# Patient Record
Sex: FEMALE | Race: WHITE | NOT HISPANIC OR LATINO | RURAL
[De-identification: names, ages, dates, MRNs, and addresses within clinical notes are randomized per-mention and may not be internally consistent; named-entity substitution may affect disease eponyms.]

---

## 2018-05-07 ENCOUNTER — APPOINTMENT (RX ONLY)
Dept: RURAL CLINIC 4 | Facility: CLINIC | Age: 53
Setting detail: DERMATOLOGY
End: 2018-05-07

## 2018-05-07 DIAGNOSIS — L57.0 ACTINIC KERATOSIS: ICD-10-CM

## 2018-05-07 DIAGNOSIS — D49.2 NEOPLASM OF UNSPECIFIED BEHAVIOR OF BONE, SOFT TISSUE, AND SKIN: ICD-10-CM

## 2018-05-07 PROBLEM — L85.3 XEROSIS CUTIS: Status: ACTIVE | Noted: 2018-05-07

## 2018-05-07 PROCEDURE — 11755 BIOPSY NAIL UNIT: CPT | Mod: TA

## 2018-05-07 PROCEDURE — ? LIQUID NITROGEN

## 2018-05-07 PROCEDURE — 17000 DESTRUCT PREMALG LESION: CPT

## 2018-05-07 PROCEDURE — ? NAIL UNIT BIOPSY

## 2018-05-07 ASSESSMENT — LOCATION SIMPLE DESCRIPTION DERM
LOCATION SIMPLE: LEFT GREAT TOE
LOCATION SIMPLE: NOSE

## 2018-05-07 ASSESSMENT — LOCATION ZONE DERM
LOCATION ZONE: TOENAIL
LOCATION ZONE: NOSE

## 2018-05-07 ASSESSMENT — LOCATION DETAILED DESCRIPTION DERM
LOCATION DETAILED: LEFT GREAT TOENAIL
LOCATION DETAILED: NASAL DORSUM

## 2018-05-07 NOTE — PROCEDURE: NAIL UNIT BIOPSY
Wound Care: Polysporin ointment
Repair Type: None, Secondary Intention
Path Notes (To The Dermatopathologist): Pigmented lesion, suspect hematoma vs. melanoma
Accession #: [id number]
Simple Repair Text: Following the biopsy the surgical site was closed primarily.
Additional Anesthesia Volume In Cc: 0
Notification Instructions: Patient will be notified of biopsy results. However, patient instructed to call the office if not contacted within 2 weeks.
Punch Size In Mm: 3
Nail Avulsion: No
Secondary Intention Text: Following the biopsy the site will heal with secondary intention.
Anesthesia Method: Digital block
Biopsy Type: H and E
Epidermal Sutures: 6-0 Nylon
Pre-Op Text: A tourniquet was applied to the proximal digit and then the site was prepped.
Billing Type: Third-Party Bill
Anesthesia Volume In Cc: 1
Anesthesia Type: 1% lidocaine with epinephrine
Size Of Lesion In Cm (Optional): 0.3
Nail Avulsion Text: The nail plate was undermined, starting at the distal nail fold,  the nail plate from the nail bed. After detaching the nail plate from the nail bed the nail plate was avulsed.
Nail Matrix Exploration Text: The nail matrix was exposed by making releasing incisions, with a 15 blade scalpel, in the proximal nail fold. The proximal nail fold was then retracted to expose the nail matrix. Following the procedure the nail fold was replaced and sutured.
Shave Biopsy Method: 15 blade
Biopsy Technique: punch biopsy
Reconstruction Of Nail Bed With Graft Text: Following the biopsy the nail bed was repaired with a skin graft.
Consent: Written consent was obtained and risks were reviewed including but not limited to scarring, infection, bleeding, scabbing, incomplete removal, nerve damage and allergy to anesthesia.
Hemostasis: Drysol
Partial Nail Avulsion Text: An incision was made in the nail plate. The nail plate was undermined, starting at the distal nail fold,  the nail plate from the nail bed. After detaching the nail plate from the nail bed the a portion of the nail plate was avulsed.
Suture Removal: 14 days
Partial Removal Of Nail Plate Text: The nail plate was partially removed to expose the underlying lesion.
Detail Level: Detailed
Post-Care Instructions: I reviewed with the patient in detail post-care instructions. Patient is to keep the biopsy site dry overnight, and then apply bacitracin twice daily until healed. Patient may apply hydrogen peroxide soaks to remove any crusting.
Nail Bed Repair Text: Following the biopsy the nail bed was repaired.
Partial Removal And Replacement Of Nail Plate Text: The nail plate was partially removed to expose the underlying lesion. The nail plate was then replaced at the end of the procedure.

## 2018-05-07 NOTE — PROCEDURE: LIQUID NITROGEN
Detail Level: Detailed
Render Post-Care Instructions In Note?: no
Aperture Size (Optional): C
Number Of Freeze-Thaw Cycles: 2 freeze-thaw cycles
Duration Of Freeze Thaw-Cycle (Seconds): 20

## 2018-05-10 ENCOUNTER — RX ONLY (OUTPATIENT)
Age: 53
Setting detail: RX ONLY
End: 2018-05-10

## 2018-05-10 RX ORDER — TERBINAFINE HCL 250 MG
TABLET ORAL
Qty: 30 | Refills: 1 | Status: ERX | COMMUNITY
Start: 2018-05-10

## 2019-06-13 ENCOUNTER — APPOINTMENT (RX ONLY)
Dept: RURAL CLINIC 4 | Facility: CLINIC | Age: 54
Setting detail: DERMATOLOGY
End: 2019-06-13

## 2019-06-13 DIAGNOSIS — T69.1XX: ICD-10-CM

## 2019-06-13 PROBLEM — T69.1XXA CHILBLAINS, INITIAL ENCOUNTER: Status: ACTIVE | Noted: 2019-06-13

## 2019-06-13 PROCEDURE — 99212 OFFICE O/P EST SF 10 MIN: CPT

## 2019-06-13 PROCEDURE — ? TREATMENT REGIMEN

## 2019-06-13 PROCEDURE — ? COUNSELING

## 2019-06-13 ASSESSMENT — LOCATION DETAILED DESCRIPTION DERM
LOCATION DETAILED: RIGHT DORSAL FOOT
LOCATION DETAILED: LEFT DORSAL FOOT

## 2019-06-13 ASSESSMENT — LOCATION SIMPLE DESCRIPTION DERM
LOCATION SIMPLE: RIGHT FOOT
LOCATION SIMPLE: LEFT FOOT

## 2019-06-13 ASSESSMENT — LOCATION ZONE DERM: LOCATION ZONE: FEET

## 2019-06-13 NOTE — HPI: BLISTERS
How Severe Are Your Blisters?: moderate
Please Check The Phrase That Best Describes Your Blisters.: few in number
Is This A New Presentation, Or A Follow-Up?: Blisters

## 2019-06-13 NOTE — PROCEDURE: TREATMENT REGIMEN
Plan: Avoid wearing flip flops early in the day and late in the evening. Try to keep your feet warm
Detail Level: Simple